# Patient Record
Sex: FEMALE | Race: OTHER | Employment: OTHER | URBAN - METROPOLITAN AREA
[De-identification: names, ages, dates, MRNs, and addresses within clinical notes are randomized per-mention and may not be internally consistent; named-entity substitution may affect disease eponyms.]

---

## 2017-03-07 PROBLEM — R31.0 GROSS HEMATURIA: Status: ACTIVE | Noted: 2017-03-07

## 2017-03-07 PROBLEM — R10.9 LEFT FLANK PAIN: Status: ACTIVE | Noted: 2017-03-07

## 2017-03-07 PROBLEM — R10.824 LEFT LOWER QUADRANT ABDOMINAL TENDERNESS WITH REBOUND TENDERNESS: Status: ACTIVE | Noted: 2017-03-07

## 2017-03-07 PROCEDURE — 88108 CYTOPATH CONCENTRATE TECH: CPT | Performed by: UROLOGY

## 2017-03-15 PROBLEM — N20.1 URETERAL CALCULUS, LEFT: Status: ACTIVE | Noted: 2017-03-15

## 2017-04-05 PROCEDURE — 81015 MICROSCOPIC EXAM OF URINE: CPT | Performed by: PHYSICIAN ASSISTANT

## 2017-04-20 PROBLEM — N20.1 URETERAL STONE: Status: ACTIVE | Noted: 2017-04-20

## 2017-09-25 PROCEDURE — 86803 HEPATITIS C AB TEST: CPT | Performed by: INTERNAL MEDICINE

## 2017-10-24 PROBLEM — N20.0 KIDNEY STONES: Status: ACTIVE | Noted: 2017-10-24

## 2017-10-24 PROBLEM — R10.824 LEFT LOWER QUADRANT ABDOMINAL TENDERNESS WITH REBOUND TENDERNESS: Status: RESOLVED | Noted: 2017-03-07 | Resolved: 2017-10-24

## 2017-10-24 PROBLEM — R10.9 LEFT FLANK PAIN: Status: RESOLVED | Noted: 2017-03-07 | Resolved: 2017-10-24

## 2017-10-24 PROBLEM — N20.1 URETERAL CALCULUS, LEFT: Status: RESOLVED | Noted: 2017-03-15 | Resolved: 2017-10-24

## 2017-10-24 PROBLEM — N20.1 URETERAL STONE: Status: RESOLVED | Noted: 2017-04-20 | Resolved: 2017-10-24

## 2017-10-24 PROBLEM — R31.0 GROSS HEMATURIA: Status: RESOLVED | Noted: 2017-03-07 | Resolved: 2017-10-24

## 2017-10-24 PROBLEM — E78.00 ELEVATED LDL CHOLESTEROL LEVEL: Status: ACTIVE | Noted: 2017-10-24

## 2020-12-11 PROBLEM — K21.9 GASTROESOPHAGEAL REFLUX DISEASE WITHOUT ESOPHAGITIS: Status: ACTIVE | Noted: 2020-12-11

## 2020-12-11 PROBLEM — Z87.442 HISTORY OF KIDNEY STONES: Status: ACTIVE | Noted: 2017-10-24

## 2021-03-03 PROCEDURE — 88305 TISSUE EXAM BY PATHOLOGIST: CPT | Performed by: INTERNAL MEDICINE

## 2024-02-01 ENCOUNTER — ORDER TRANSCRIPTION (OUTPATIENT)
Dept: ADMINISTRATIVE | Facility: HOSPITAL | Age: 74
End: 2024-02-01

## 2024-02-01 DIAGNOSIS — Z13.6 SCREENING FOR CARDIOVASCULAR CONDITION: Primary | ICD-10-CM

## 2024-03-06 ENCOUNTER — HOSPITAL ENCOUNTER (OUTPATIENT)
Dept: CT IMAGING | Facility: HOSPITAL | Age: 74
Discharge: HOME | End: 2024-03-06
Attending: INTERNAL MEDICINE

## 2024-03-06 VITALS — SYSTOLIC BLOOD PRESSURE: 90 MMHG | DIASTOLIC BLOOD PRESSURE: 60 MMHG

## 2024-03-06 DIAGNOSIS — Z13.6 SCREENING FOR CARDIOVASCULAR CONDITION: ICD-10-CM

## 2024-03-06 LAB
POCT GLUCOSE CHOLESTECH: 84 (ref 70–99)
POCT HDL: 72 (ref 40–60)
POCT LDL: 140 (ref 0–99)
POCT TOTAL CHOLESTEROL: 250 (ref 110–200)
POCT TRIGLYCERIDES: 194 (ref 1–149)

## 2024-03-07 NOTE — PROGRESS NOTES
Date of Service 3/6/2024    JONATHAN EDGAR  Date of Birth 10/2/1950    Patient Age: 73 year old    PCP: Herman Queen MD  [address] [facility]  [address]  [facility] [address]    Heart Scan Consult  Preliminary Heart Scan Score: 0    Previous Screening  Heart Scan Completed Previously: No        Peripheral Vascular Scan Completed Previously: No          Risk Factors  Personal Risk Factors  Non-alterable Risk Factors: Age;Gender;Family History (Reports, Mother's side relatives had heart attacks (from Grand Ma to Uncles))  Alterable Risk Factors: Abnormal Cholesterol      Body Mass Index  There is no height or weight on file to calculate BMI.    Blood Pressure     BP 90/60 (BP Location: Right arm)     (Normal =< 120/80,  Elevated = 120-129/ >80,  High Stage1 130-139/80-89 , Stage2 >140/>90)    Lipid Profile  Patient was in fasting state: No    Cholesterol: 250, done on 3/6/2024.  HDL Cholesterol: 72, done on 3/6/2024.  LDL Cholesterol: 140, done on 3/6/2024.  TriGlycerides 194, done on 3/6/2024.    Cholesterol Goals  Value   Total  =< 200   HDL  = > 45 Men = > 55 Women   LDL   =< 100   Triglycerides  =< 150       Glucose and Hemoglobin A1C  Lab Results   Component Value Date    GLU 83 03/24/2022    A1C 5.5 02/09/2016     (Normal Fasting Glucose < 100mg/dl )    Nurse Review  Risk factor information and results reviewed with Nurse: Yes    Recommended Follow Up:  Consult your physician regarding:: Discuss potential for Incidental Finding;Discuss Potential for Score Variance;Final Heart Scan Report      Recommendations for Change:  Nutrition Changes: Low Saturated Fat;Low Fat Dairy;Low Salt Eating;Increase Fiber    Cholesterol Modification (goal of therapy depends upon your risk): Decrease Triglycerides (Ugly) Normal <150;Decrease LDL (Lousy/Bad) Ideal <100;Decrease Total Normal <200                        Repeat Heart Scan: 5 years if Calcium Score is 0.0;Discuss with your Physician              [facility]  Recommended Resources:  Recommended Resources: Upcoming Classes, Medical Services and Health Library [email or web address]     Other Resources:: Hnadouts given - Controlling Risk Factors, and Cholesterol      Julissa MCLAIN, RN        Please Contact the Nurse Heart Line with any Questions or Concerns [phone].